# Patient Record
Sex: MALE | Race: WHITE
[De-identification: names, ages, dates, MRNs, and addresses within clinical notes are randomized per-mention and may not be internally consistent; named-entity substitution may affect disease eponyms.]

---

## 2019-10-25 ENCOUNTER — HOSPITAL ENCOUNTER (OUTPATIENT)
Dept: HOSPITAL 74 - FASU | Age: 34
Discharge: HOME | End: 2019-10-25
Attending: ORTHOPAEDIC SURGERY
Payer: COMMERCIAL

## 2019-10-25 VITALS — HEART RATE: 70 BPM | SYSTOLIC BLOOD PRESSURE: 121 MMHG | DIASTOLIC BLOOD PRESSURE: 72 MMHG | TEMPERATURE: 97.9 F

## 2019-10-25 VITALS — BODY MASS INDEX: 25.1 KG/M2
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
## 2019-10-25 DIAGNOSIS — S83.8X2A: ICD-10-CM

## 2019-10-25 DIAGNOSIS — M65.862: ICD-10-CM

## 2019-10-25 DIAGNOSIS — S83.282A: Primary | ICD-10-CM
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
## 2019-10-25 PROCEDURE — 0SBD4ZZ EXCISION OF LEFT KNEE JOINT, PERCUTANEOUS ENDOSCOPIC APPROACH: ICD-10-PCS | Performed by: ORTHOPAEDIC SURGERY

## 2019-10-25 NOTE — OP
DATE OF OPERATION:  10/25/2019

 

Done at [facility] 

 

SURGEON:  Bonifacio Henry MD 

 

ASSISTANT:  JORGITO Drake 

 

PREOPERATIVE DIAGNOSES:

1.  Left knee lateral meniscal tear.  

2.  Left knee cartilage injury.

3.  Left knee synovitis.  

 

POSTOPERATIVE DIAGNOSES: 

1.  Left knee lateral meniscal tear.  

2.  Left knee cartilage injury.

3.  Left knee synovitis.  

 

PROCEDURES:

1.  Left knee arthroscopy with partial meniscectomy lateral meniscus, CPT code 46810.

2.  Left knee arthroscopy with chondroplasty, CPT code 50621.

3.  Left knee arthroscopy with synovectomy, CPT code 74338. 

 

FINDINGS:

1.  Medial meniscus _____ tearing. 

2.  Lateral meniscus bucket handle tear from the posterior to central 1/3 with a

complex tear of the anterior 1/3 of the lateral meniscus. 

3.  Synovitis patellofemoral medial and lateral notch area.

4.  Minimal cartilage injury medial joint line. 

5.  ACL and PCL intact.

6.  Minor grade 2 changes anterior lateral tibial plateau. 

7.  Central grade 2 cartilage injury patella and patellofemoral trochlea. 

8.  Large medial plica with thickened scar tissue anteriorly and medially. 

 

PROCEDURE:  Informed consent was obtained.  The patient came to the operating room,

where the lower extremity was prepped and draped in a sterile fashion.  A tourniquet

was placed on the upper thigh, but not inflated. 

 

Using standard arthroscopic technique, a lateral incision and portal was made to

allow for introduction of the camera into the suprapatellar bursa.  This was then

taken to the medial joint line, where under direct visualization, a medial incision

and portal was made.

 

Excessive synovium noted in the medial, lateral and patellofemoral and notch area was

removed by an upbiter, shaver and Bovie cautery.  This was found to bring in

inflammatory tissue into the joint surface, a source of pain and dysfunction. 

 

Probing of the medial and lateral meniscus found tears, as described in the findings.

 These were removed with the upbiter and shaver and taken back to a stable rim. 

 

Grade 2 to 3 degenerative changes were treated with a chondroplasty, removing all

flaking surfaces with low-setting Bovie along the periphery to prevent further

flaking.  

 

Grade 4 changes, as noted, were treated with an abrasoplasty, creating a bleeding

surface at the bone/cartilage interface.  Aggressive debridement with shaver/dylan

created bleeding surface.  Micro fracture also done when indicated in findings.

 

All areas of the knee were once again reexamined.  The knee was then drained and a

single suture was placed in all portals.  A sterile dressing was placed and the

patient was transferred to the recovery room without complication. 

 

The PA listed above was present and assisted at surgery.  Their presence was

absolutely medically necessary for the completion of the procedure.  They helped hold

the arthroscopy, pass instruments (and implants when indicated) and the procedure

could not have been completed without their assistance.

 

 

BONIFACIO HENRY M.D.

 

[id number]

DD: 10/25/2019 13:55

DT: 10/25/2019 14:44

Job #:  [id number]

## 2019-10-30 NOTE — PATH
Surgical Pathology Report



Patient Name:  MARCELINA MCCONNELL

Accession #:  [id number]

Med. Rec. #:  [id number]                                                        

   /Age/Gender:  1985 (Age: 34) / M

Account:  [id number]                                                          

             Location: [facility] AMBULATORY 

Taken:  10/25/2019

Received:  10/25/2019

Reported:  10/30/2019

Physicians:  Bonifacio Jo M.D.

  



Specimen(s) Received

 LEFT KNEE SHAVINGS 





Clinical History

Internal derangement of the left knee







Final Diagnosis

KNEE, LEFT, ARTHROSCOPIC SHAVINGS:

FIBROSYNOVIAL TISSUE AND CARTILAGE.





***Electronically Signed***

Aminata Ellington M.D.





Gross Description

Received in formalin, labeled "left knee shavings," is a 4.2 x 4.0 x 0.3 cm.

aggregate of tan-yellow soft tissue fragments. A representative portion is

submitted in one cassette.

/10/29/2019



saudi/10/29/2019